# Patient Record
Sex: MALE | Race: WHITE | NOT HISPANIC OR LATINO | URBAN - METROPOLITAN AREA
[De-identification: names, ages, dates, MRNs, and addresses within clinical notes are randomized per-mention and may not be internally consistent; named-entity substitution may affect disease eponyms.]

---

## 2017-01-01 ENCOUNTER — HOME CARE/HOSPICE - HEALTHEAST (OUTPATIENT)
Dept: HOME HEALTH SERVICES | Facility: HOME HEALTH | Age: 82
End: 2017-01-01

## 2017-01-01 ENCOUNTER — AMBULATORY - HEALTHEAST (OUTPATIENT)
Dept: MEDSURG UNIT | Facility: CLINIC | Age: 82
End: 2017-01-01

## 2017-01-01 ENCOUNTER — COMMUNICATION - HEALTHEAST (OUTPATIENT)
Dept: INTERNAL MEDICINE | Facility: CLINIC | Age: 82
End: 2017-01-01

## 2017-01-01 ENCOUNTER — COMMUNICATION - HEALTHEAST (OUTPATIENT)
Dept: CARE COORDINATION | Facility: CLINIC | Age: 82
End: 2017-01-01

## 2017-01-01 ENCOUNTER — RECORDS - HEALTHEAST (OUTPATIENT)
Dept: GENERAL RADIOLOGY | Facility: CLINIC | Age: 82
End: 2017-01-01

## 2017-01-01 ENCOUNTER — OFFICE VISIT - HEALTHEAST (OUTPATIENT)
Dept: INTERNAL MEDICINE | Facility: CLINIC | Age: 82
End: 2017-01-01

## 2017-01-01 ENCOUNTER — COMMUNICATION - HEALTHEAST (OUTPATIENT)
Dept: HOME HEALTH SERVICES | Facility: HOME HEALTH | Age: 82
End: 2017-01-01

## 2017-01-01 ENCOUNTER — COMMUNICATION - HEALTHEAST (OUTPATIENT)
Dept: MEDSURG UNIT | Facility: CLINIC | Age: 82
End: 2017-01-01

## 2017-01-01 ENCOUNTER — COMMUNICATION - HEALTHEAST (OUTPATIENT)
Dept: OCCUPATIONAL THERAPY | Facility: CLINIC | Age: 82
End: 2017-01-01

## 2017-01-01 DIAGNOSIS — I10 ESSENTIAL HYPERTENSION: ICD-10-CM

## 2017-01-01 DIAGNOSIS — Z23 NEED FOR IMMUNIZATION AGAINST INFLUENZA: ICD-10-CM

## 2017-01-01 DIAGNOSIS — J96.01 ACUTE HYPOXEMIC RESPIRATORY FAILURE (H): ICD-10-CM

## 2017-01-01 DIAGNOSIS — I48.92 ATRIAL FLUTTER, UNSPECIFIED TYPE (H): ICD-10-CM

## 2017-01-01 DIAGNOSIS — J47.1 BRONCHIECTASIS WITH ACUTE EXACERBATION (H): ICD-10-CM

## 2017-01-01 DIAGNOSIS — I50.9 CONGESTIVE HEART FAILURE, UNSPECIFIED CONGESTIVE HEART FAILURE CHRONICITY, UNSPECIFIED CONGESTIVE HEART FAILURE TYPE: ICD-10-CM

## 2017-01-01 DIAGNOSIS — I50.9 HEART FAILURE, UNSPECIFIED (H): ICD-10-CM

## 2017-01-01 DIAGNOSIS — K44.9 LARGE HIATAL HERNIA: ICD-10-CM

## 2017-01-01 DIAGNOSIS — E83.42 HYPOMAGNESEMIA: ICD-10-CM

## 2017-01-01 RX ORDER — SIMVASTATIN 20 MG
20 TABLET ORAL AT BEDTIME
Refills: 1 | Status: SHIPPED | COMMUNITY
Start: 2017-01-01

## 2017-01-01 ASSESSMENT — MIFFLIN-ST. JEOR: SCORE: 1453.24

## 2018-01-01 ENCOUNTER — HOME CARE/HOSPICE - HEALTHEAST (OUTPATIENT)
Dept: HOME HEALTH SERVICES | Facility: HOME HEALTH | Age: 83
End: 2018-01-01

## 2018-01-01 ENCOUNTER — COMMUNICATION - HEALTHEAST (OUTPATIENT)
Dept: HOME HEALTH SERVICES | Facility: HOME HEALTH | Age: 83
End: 2018-01-01

## 2018-01-01 ENCOUNTER — AMBULATORY - HEALTHEAST (OUTPATIENT)
Dept: PULMONOLOGY | Facility: OTHER | Age: 83
End: 2018-01-01

## 2018-01-01 ENCOUNTER — OFFICE VISIT - HEALTHEAST (OUTPATIENT)
Dept: PODIATRY | Facility: CLINIC | Age: 83
End: 2018-01-01

## 2018-01-01 ENCOUNTER — COMMUNICATION - HEALTHEAST (OUTPATIENT)
Dept: SCHEDULING | Facility: CLINIC | Age: 83
End: 2018-01-01

## 2018-01-01 ENCOUNTER — COMMUNICATION - HEALTHEAST (OUTPATIENT)
Dept: INTERNAL MEDICINE | Facility: CLINIC | Age: 83
End: 2018-01-01

## 2018-01-01 ENCOUNTER — RECORDS - HEALTHEAST (OUTPATIENT)
Dept: LAB | Facility: CLINIC | Age: 83
End: 2018-01-01

## 2018-01-01 ENCOUNTER — HOME CARE/HOSPICE - HEALTHEAST (OUTPATIENT)
Dept: HOSPICE | Facility: HOSPICE | Age: 83
End: 2018-01-01

## 2018-01-01 DIAGNOSIS — I73.9 PERIPHERAL VASCULAR DISEASE OF LOWER EXTREMITY (H): ICD-10-CM

## 2018-01-01 DIAGNOSIS — J96.10 CHRONIC RESPIRATORY FAILURE (H): ICD-10-CM

## 2018-01-01 DIAGNOSIS — B35.1 NAIL FUNGUS: ICD-10-CM

## 2018-01-01 DIAGNOSIS — L60.2 ONYCHAUXIS: ICD-10-CM

## 2018-01-01 DIAGNOSIS — I73.9 PERIPHERAL VASCULAR DISEASE (H): ICD-10-CM

## 2018-01-01 DIAGNOSIS — L84 TYLOMA: ICD-10-CM

## 2018-01-01 DIAGNOSIS — I89.0 LYMPHEDEMA: ICD-10-CM

## 2018-01-01 LAB
ALBUMIN UR-MCNC: ABNORMAL MG/DL
APPEARANCE UR: ABNORMAL
BACTERIA #/AREA URNS HPF: ABNORMAL HPF
BACTERIA SPEC CULT: NO GROWTH
BILIRUB UR QL STRIP: ABNORMAL
CAOX CRY #/AREA URNS HPF: PRESENT /[HPF]
COLOR UR AUTO: ABNORMAL
GLUCOSE UR STRIP-MCNC: NEGATIVE MG/DL
HGB UR QL STRIP: NEGATIVE
KETONES UR STRIP-MCNC: NEGATIVE MG/DL
LEUKOCYTE ESTERASE UR QL STRIP: NEGATIVE
MUCOUS THREADS #/AREA URNS LPF: ABNORMAL LPF
NITRATE UR QL: NEGATIVE
PH UR STRIP: 6 [PH] (ref 4.5–8)
RBC #/AREA URNS AUTO: ABNORMAL HPF
SP GR UR STRIP: 1.03 (ref 1–1.03)
SQUAMOUS #/AREA URNS AUTO: ABNORMAL LPF
UROBILINOGEN UR STRIP-ACNC: ABNORMAL
WBC #/AREA URNS AUTO: ABNORMAL HPF
WBC CLUMPS #/AREA URNS HPF: PRESENT /[HPF]

## 2018-01-01 ASSESSMENT — MIFFLIN-ST. JEOR: SCORE: 1349.2

## 2018-03-01 ENCOUNTER — COMMUNICATION - HEALTHEAST (OUTPATIENT)
Dept: INTERNAL MEDICINE | Facility: CLINIC | Age: 83
End: 2018-03-01

## 2018-03-01 ENCOUNTER — HOME CARE/HOSPICE - HEALTHEAST (OUTPATIENT)
Dept: HOME HEALTH SERVICES | Facility: HOME HEALTH | Age: 83
End: 2018-03-01

## 2018-03-01 ENCOUNTER — HOME CARE/HOSPICE - HEALTHEAST (OUTPATIENT)
Dept: HOSPICE | Facility: HOSPICE | Age: 83
End: 2018-03-01

## 2021-05-30 ENCOUNTER — RECORDS - HEALTHEAST (OUTPATIENT)
Dept: ADMINISTRATIVE | Facility: CLINIC | Age: 86
End: 2021-05-30

## 2021-05-31 VITALS — BODY MASS INDEX: 21.55 KG/M2 | HEIGHT: 70 IN | WEIGHT: 150.56 LBS

## 2021-05-31 VITALS — WEIGHT: 149 LBS | BODY MASS INDEX: 18.62 KG/M2

## 2021-05-31 VITALS — WEIGHT: 156 LBS | HEIGHT: 75 IN | BODY MASS INDEX: 19.4 KG/M2

## 2021-06-13 NOTE — PROGRESS NOTES
Office Visit - Follow up    Tucker Mo   86 y.o. male    Date of Visit: 11/1/2017    Chief Complaint   Patient presents with     Establish Care     stamina, balance       Subjective: CHF establish care.  Balance issues stamina issues meds reviewed.  Originally from [facility].  And cared for at the [facility].  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  He feels that treatment for his malignancies and other medical conditions have led to poor balance poor stamina and breathing is still a problem.  He feels winded if he bends over and ties his shoes or put his socks on.    No blood in stool no blood in urine or sputum.  Medication list reviewed and only well-tolerated.  Allergies none.  Current meds reviewed simvastatin HCTZ prednisone.    ROS: A comprehensive review of systems was performed and was otherwise negative    Medications:  Prior to Admission medications    Medication Sig Start Date End Date Taking? Authorizing Provider   hydroCHLOROthiazide (MICROZIDE) 12.5 mg capsule Take 12.5 mg by mouth daily.   Yes PROVIDER, HISTORICAL   predniSONE (DELTASONE) 1 MG tablet Take 1 mg by mouth daily.   Yes PROVIDER, HISTORICAL   simvastatin (ZOCOR) 20 MG tablet Take 20 mg by mouth daily. 10/17/17   PROVIDER, HISTORICAL       Allergies: No Known Allergies    Immunizations:   There is no immunization history on file for this patient.    Exam Chest clear to auscultation and percussion.  Heart tones regular rhythm without murmur rub or gallop.  Abdomen soft nontender no organomegaly.  No peritoneal signs.  Extremities free of edema cyanosis or clubbing.  Neck veins nondistended no thyromegaly or scleral icterus noted, carotids full.  Skin warm and dry easily conversant good spirited.  Normal intelligence.  Neurologically intact no gross localizing findings.  Wheelchair-bound not in acute distress not toxic accompanied by his  granddaughter today.    Assessment and Plan  CHF with hyperlipidemia and steroid dependency.  Poor balance and poor stamina.  Check hemogram plus basic metabolic profile brain natruretic peptide and chest x-ray today.  May need med adjustment reemphasized salt restriction and diet same meds and cares for now RTC 6 weeks.    Time: total time spent with the patient was 40 minutes of which >50% was spent in counseling and coordination of care        Best Noriega MD    There is no problem list on file for this patient.

## 2021-06-13 NOTE — PROGRESS NOTES
Received intake call for home oxygen at 10:00AM. Reviewed patient's chart; Patient qualifies under Medicare guidelines and all documentation is in the chart including a good order.   Called to offer choice, spoke with patient and patients granddaughter Gladys, they are okay with [facility] setting them up. Discussed equipment with patient and informed them that we would be to bedside with oxygen in the next 1 hour, so patient can take Oxygen with him to appts after discharge.    11:00 Spoke with nurse, Sheryl, confirmed we received the order, and provided them with ETA of oxygen.

## 2021-06-14 NOTE — PROGRESS NOTES
Office Visit - Follow Up   Tucker Mo   86 y.o. male    Date of Visit: 11/14/2017    Chief Complaint   Patient presents with     Hospital Visit Follow Up        Assessment and Plan   1. Acute hypoxemic respiratory failure  This seems to be resolved, some component of atelectasis from large hiatal hernia compressing his lower lobe.  Oxygen saturations are normal on room air, okay to have oxygen as he has had some issues with the tubing.  He will continue furosemide 40 mg daily we will check his potassium, may need to increase potassium is taking    2. Atrial flutter, unspecified type  Now in sinus rhythm, has not been on anticoagulation I discussed this at length with him and his granddaughter, if he is not having significant falls I would recommend he start low-dose Eliquis  - Basic Metabolic Panel; Future  - Basic Metabolic Panel    3. Bronchiectasis with acute exacerbation  See above    4. Hypomagnesemia  - Magnesium    5. Large hiatal hernia  See above, stable on omeprazole    Return in about 4 weeks (around 12/12/2017) for follow up with PCP.     History of Present Illness   This 86 y.o. old man comes in for post hospital follow-up.  He is admitted to [facility] with atrial fibrillation acute hypoxic respiratory failure.  He had a CT PE study which was negative for pulmonary embolus.  He had an echocardiogram which showed normal ejection fraction mild biatrial enlargement and mild aortic sclerosis.  He spontaneously converted to normal sinus rhythm.  He seen by cardiology and there is no further recommendation for anticoagulation given his falls risk.  He is recently had multiple falls since moving here from [place].  He is with his granddaughter today, Gladys, who is a nurse at the [employer].  He has had some lower edema and is taking furosemide twice daily.  He said a low potassium and hydrochlorothiazide which was stopped.  He was and actually left the hospital would not no longer  "using because he is not short of breath and he has been tripping over it.    Review of Systems: A comprehensive review of systems was negative except as noted.     Medications, Allergies and Problem List   Reviewed and updated     Physical Exam   General Appearance:   No acute distress    /72 (Patient Site: Left Arm, Patient Position: Sitting, Cuff Size: Adult Regular)  Pulse 91  Ht 6' 3\" (1.905 m)  Wt 156 lb (70.8 kg)  SpO2 95%  BMI 19.5 kg/m2    HEENT exam is unremarkable  Neck supple no thyromegaly or nodule palpable  Lymphatic no cervical lymphadenopathy  Cardiovascular regular rate and rhythm no murmur gallop or rub, moderate amount of pitting edema in his ankles  Pulmonary lungs are clear to auscultation bilaterally  Gastrointestinall abdomen soft nontender nondistended no organomegaly  Neurologic exam is non focal  Psychiatric pleasant, no confusion or agitation        Additional Information   Current Outpatient Prescriptions   Medication Sig Dispense Refill     acetaminophen (TYLENOL) 650 MG CR tablet Take 1,300 mg by mouth 2 (two) times a day.       aspirin 325 MG tablet Take 325 mg by mouth daily.       docusate sodium (COLACE) 100 MG capsule Take 100 mg by mouth 2 (two) times a day as needed for constipation.       furosemide (LASIX) 20 MG tablet Take 1 tablet (20 mg total) by mouth daily. 30 tablet 0     glucosamine-chondroitin (OSTEO BI-FLEX) 250-200 mg Tab Take 2 tablets by mouth daily.       lidocaine (LMX) 4 % cream Apply 1 application topically 2 (two) times a day as needed.       magnesium oxide (MAG-OX) 400 mg tablet Take 1 tablet (400 mg total) by mouth 3 (three) times a day.  0     methyl salicylate-menthol (ICY HOT) 29-7.6 % Oint Apply 1 application topically 2 (two) times a day as needed.       metoprolol succinate (TOPROL-XL) 25 MG Take 1 tablet (25 mg total) by mouth daily. 30 tablet 0     multivitamin with minerals (THERA-M) 9 mg iron-400 mcg Tab tablet Take 1 tablet by mouth " daily.       omeprazole (PRILOSEC) 20 MG capsule Take 1 capsule (20 mg total) by mouth daily before breakfast. 30 capsule 0     OXYGEN-AIR DELIVERY SYSTEMS MISC 1 % into each nostril continuous. PT USING 1L AT REST  3 LPM WITH ACTIVITY AND SLEEP  Indications: KEEP OXYGEN SATURATIONS ABOVE 90%       potassium chloride SA (K-DUR,KLOR-CON) 20 MEQ tablet Take 1 tablet (20 mEq total) by mouth 2 (two) times a day. 90 tablet 5     predniSONE (DELTASONE) 5 MG tablet Take 10 mg by mouth daily.       psyllium (METAMUCIL) 3.4 gram packet Take 1 packet by mouth at bedtime.       simvastatin (ZOCOR) 20 MG tablet Take 20 mg by mouth at bedtime.   1     No current facility-administered medications for this visit.      No Known Allergies  Social History   Substance Use Topics     Smoking status: Never Smoker     Smokeless tobacco: None     Alcohol use None       Review and/or order of clinical lab tests: Reviewed and ordered  Review and/or order of radiology tests: Reviewed and ordered  Review and/or order of medicine tests: Reviewed and ordered  Discussion of test results with performing physician:  Decision to obtain old records and/or obtain history from someone other than the patient:  Review and summarization of old records and/or obtaining history from someone other than the patient and.or discussion of case with another health care provider: Reviewed his history and physical, discharge summary and consultative notes summarized above  Independent visualization of image, tracing or specimen itself:    Time:      Rehan Beal MD

## 2021-06-15 NOTE — PROGRESS NOTES
Admission History & Physical  Tucker Mo, 5/19/1931, [id number]    [facility]  Best Noriega MD, [phone]    Extended Emergency Contact Information  Primary Emergency Contact: Nahid Mo   [place]  Mobile Phone: [phone]  Relation: Child  Secondary Emergency Contact: Gladys Mo   [place]  Mobile Phone: [phone]  Relation: Grandchild     Assessment and Plan:   Assessment: Onychomycosis, onychauxis, tyloma, lymphedema  Plan: Debrided nails 1 through 5 both feet.  I recommended Jobst stockings  Active Problems:    * No active hospital problems. *      Chief Complaint:  Long thick nails both feet, callus left foot     HPI:    Tucker Mo is a 86 y.o. old male who presented to the clinic today along with his family for foot care.  The patient has long thick nails both feet.  He also has a callus on the bottom of the left foot.  The patient is unable to treat his nails.  He has severe edema both lower extremities.  He does wear compressions socks.  This does not improve the swelling of both feet.  He is wheelchair-bound.  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     Hypokalemia     Shortness of breath     Chronic hypoxemic respiratory failure     Bronchiectasis with acute exacerbation     Large hiatal hernia     Hypomagnesemia     Acute hypoxemic respiratory failure     Elevated brain natriuretic peptide (BNP) level     Essential hypertension     Dyspnea on exertion     Atrial flutter, unspecified type     Past Medical History:   Diagnosis Date     Atrial flutter      Hypertension      Lymphoma      Prostate cancer      Patient Active Problem List    Diagnosis Date Noted     Other insomnia 01/01/2018     Peripheral vascular  "disease 01/01/2018     Hyperlipidemia 12/30/2017     Lymphoma in remission 12/30/2017     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   Past Surgical History:   Procedure Laterality Date     APPENDECTOMY      Social History  Reviewed, and he  reports that he has never smoked. He has never used smokeless tobacco. He reports that he does not drink alcohol or use illicit drugs.  Social History   Substance Use Topics     Smoking status: Never Smoker     Smokeless tobacco: Never Used     Alcohol use No      Allergies  No Known Allergies Family History  Reviewed, and family history is not on file.   Psychosocial Needs  Social History     Social History Narrative     Additional psychosocial needs reviewed per nursing assessment.       Prior to Admission Medications     (Not in a hospital admission)        Review of Systems - Negative     BP 90/50  Resp 16  Ht 5' 10\" (1.778 m)  Wt 150 lb 9 oz (68.3 kg)  BMI 21.6 kg/m2    Objective findings: General: The patient is alert and in no acute distress      Integument: Nails bilateral feet are elongated and 2-3 times normal thickness.  Skin bilateral feet warm and intact.      Vascular: DP and PT pulses absent bilaterally. Capillary refill less than 2 seconds bilaterally.  There is +4 pitting edema bilateral feet.      Neurologic: Negative clonus, negative Babinski bilateral feet.      Musculoskeletal: Range of motion within normal limits bilaterally.  Muscle power +1/5 " bilaterally in all compartments.      Assessment: Onychomycosis, onychauxis, lymphedema       Plan: Debrided nails 1 through 5 both feet.  The patient was given a prescription for Jobst stockings in an attempt to diminish his lymphedema.

## 2021-06-16 PROBLEM — G47.09 OTHER INSOMNIA: Status: ACTIVE | Noted: 2018-01-01

## 2021-06-16 PROBLEM — R09.02 HYPOXIA: Status: ACTIVE | Noted: 2018-01-01

## 2021-06-16 PROBLEM — C85.9A LYMPHOMA IN REMISSION: Status: ACTIVE | Noted: 2017-01-01

## 2021-06-16 PROBLEM — I73.9 PERIPHERAL VASCULAR DISEASE (H): Status: ACTIVE | Noted: 2018-01-01

## 2021-06-16 PROBLEM — R06.02 SHORTNESS OF BREATH: Status: ACTIVE | Noted: 2017-01-01

## 2021-06-16 PROBLEM — I48.92 ATRIAL FLUTTER, UNSPECIFIED TYPE (H): Status: ACTIVE | Noted: 2017-01-01

## 2021-06-16 PROBLEM — J47.1 BRONCHIECTASIS WITH ACUTE EXACERBATION (H): Status: ACTIVE | Noted: 2017-01-01

## 2021-06-16 PROBLEM — C61 PROSTATE CANCER METASTATIC TO MULTIPLE SITES (H): Status: ACTIVE | Noted: 2017-01-01

## 2021-06-16 PROBLEM — K44.9 LARGE HIATAL HERNIA: Status: ACTIVE | Noted: 2017-01-01

## 2021-06-16 PROBLEM — E87.6 HYPOKALEMIA: Status: ACTIVE | Noted: 2017-01-01

## 2021-06-16 PROBLEM — E83.42 HYPOMAGNESEMIA: Status: ACTIVE | Noted: 2017-01-01

## 2021-06-16 PROBLEM — E78.5 HYPERLIPIDEMIA: Status: ACTIVE | Noted: 2017-01-01

## 2021-07-03 NOTE — ADDENDUM NOTE
Addendum Note by Cruzito Bah CMA at 11/1/2017  2:21 PM     Author: Cruzito Bah CMA Service: -- Author Type: Certified Medical Assistant    Filed: 11/1/2017  2:21 PM Encounter Date: 11/1/2017 Status: Signed    : Cruzito Bah CMA (Certified Medical Assistant)    Addended by: CRUZITO BAH on: 11/1/2017 02:21 PM        Modules accepted: Orders